# Patient Record
Sex: MALE | Race: WHITE | NOT HISPANIC OR LATINO | URBAN - METROPOLITAN AREA
[De-identification: names, ages, dates, MRNs, and addresses within clinical notes are randomized per-mention and may not be internally consistent; named-entity substitution may affect disease eponyms.]

---

## 2017-03-05 ENCOUNTER — EMERGENCY (EMERGENCY)
Facility: HOSPITAL | Age: 57
LOS: 1 days | Discharge: HOME | End: 2017-03-05
Attending: EMERGENCY MEDICINE | Admitting: EMERGENCY MEDICINE
Payer: COMMERCIAL

## 2017-03-05 VITALS
SYSTOLIC BLOOD PRESSURE: 130 MMHG | TEMPERATURE: 98 F | RESPIRATION RATE: 16 BRPM | DIASTOLIC BLOOD PRESSURE: 85 MMHG | HEART RATE: 75 BPM | OXYGEN SATURATION: 98 %

## 2017-03-05 VITALS
SYSTOLIC BLOOD PRESSURE: 161 MMHG | OXYGEN SATURATION: 95 % | DIASTOLIC BLOOD PRESSURE: 112 MMHG | RESPIRATION RATE: 17 BRPM | TEMPERATURE: 97 F | HEART RATE: 97 BPM

## 2017-03-05 DIAGNOSIS — M54.5 LOW BACK PAIN: ICD-10-CM

## 2017-03-05 PROCEDURE — 99284 EMERGENCY DEPT VISIT MOD MDM: CPT | Mod: 25

## 2017-03-05 PROCEDURE — 96374 THER/PROPH/DIAG INJ IV PUSH: CPT

## 2017-03-05 PROCEDURE — 99284 EMERGENCY DEPT VISIT MOD MDM: CPT

## 2017-03-05 RX ORDER — OXYCODONE HYDROCHLORIDE 5 MG/1
5 TABLET ORAL ONCE
Qty: 0 | Refills: 0 | Status: DISCONTINUED | OUTPATIENT
Start: 2017-03-05 | End: 2017-03-05

## 2017-03-05 RX ORDER — DIAZEPAM 5 MG
5 TABLET ORAL ONCE
Qty: 0 | Refills: 0 | Status: DISCONTINUED | OUTPATIENT
Start: 2017-03-05 | End: 2017-03-05

## 2017-03-05 RX ORDER — IBUPROFEN 200 MG
1 TABLET ORAL
Qty: 21 | Refills: 0 | OUTPATIENT
Start: 2017-03-05 | End: 2017-03-12

## 2017-03-05 RX ORDER — KETOROLAC TROMETHAMINE 30 MG/ML
30 SYRINGE (ML) INJECTION ONCE
Qty: 0 | Refills: 0 | Status: DISCONTINUED | OUTPATIENT
Start: 2017-03-05 | End: 2017-03-05

## 2017-03-05 RX ORDER — DIAZEPAM 5 MG
1 TABLET ORAL
Qty: 15 | Refills: 0 | OUTPATIENT
Start: 2017-03-05 | End: 2017-03-10

## 2017-03-05 RX ORDER — OXYCODONE HYDROCHLORIDE 5 MG/1
1 TABLET ORAL
Qty: 20 | Refills: 0 | OUTPATIENT
Start: 2017-03-05 | End: 2017-03-10

## 2017-03-05 RX ADMIN — OXYCODONE HYDROCHLORIDE 5 MILLIGRAM(S): 5 TABLET ORAL at 16:10

## 2017-03-05 RX ADMIN — OXYCODONE HYDROCHLORIDE 5 MILLIGRAM(S): 5 TABLET ORAL at 17:56

## 2017-03-05 RX ADMIN — Medication 5 MILLIGRAM(S): at 16:10

## 2017-03-05 RX ADMIN — Medication 30 MILLIGRAM(S): at 16:10

## 2017-03-05 NOTE — ED PROVIDER NOTE - ATTENDING CONTRIBUTION TO CARE
I have personally performed a face to face diagnostic evaluation on this patient.  I have reviewed the ACP note and agree with the history, exam, and plan of care, except as noted.   My medical decison making and observations are found above.

## 2017-03-05 NOTE — ED ADULT NURSE NOTE - OBJECTIVE STATEMENT
56 yr old male with wife from home, through triage, presents with L sided lower back pain, started after pulling dresser drawer open, "I threw my back out", was also lifting 25-30 lb boxes from storage, pain unrelieved with advil at 1 pm, at present c/o L sided pain, radiating to R lower back, into upper R buttock, denies bowel/bladder incontinence, not radiating to legs, +tender to L lower paraspinal musculature, +antalgic gait, at baseline ambulatory, independent with ADLs, strong extremities to manual resistance, full AROM x 4 extremities noted

## 2017-03-05 NOTE — ED PROVIDER NOTE - OBJECTIVE STATEMENT
57yo male pt, no PMHx c/o low back pain/ stiffness after bending over this am. Denies fall or other injuries. Denies radiating pain to legs. Denies sensory changes or weakness. Denies CP/SOB/ABD pain or N/V/D. Denies urinary or bowel problems. Denies fever, chills or recent cold symptoms.

## 2017-03-05 NOTE — ED PROVIDER NOTE - PROGRESS NOTE DETAILS
Pt stated feeling better, ambulatory, Neuro- intact Pt stated feeling better, ambulatory, Neuro- intact  marla marcelino md

## 2017-03-05 NOTE — ED PROVIDER NOTE - MEDICAL DECISION MAKING DETAILS
juan;son - acute lower back pain after bending - no red flags - no neuro deficit - analgesia muscle relaxer asnd reeval - likely outpt spine f/u

## 2017-03-05 NOTE — ED PROVIDER NOTE - PHYSICAL EXAMINATION
NAD, Hypertensive, Afebrile, No spinal mid tender, + B/L para lumbar tender/ stiffness, No sciatica tender, Neuro- intact. NAD, Hypertensive, Afebrile, No spinal mid tender, + B/L para lumbar tender/ stiffness, No sciatica tender, Neuro- intact.  hanna antalgic gait stregth 5/5le bl, slt neg paraspinal ttp l>r  no saddle anestheisa

## 2023-04-17 ENCOUNTER — EMERGENCY (EMERGENCY)
Facility: HOSPITAL | Age: 63
LOS: 1 days | Discharge: HOME | End: 2023-04-17
Attending: EMERGENCY MEDICINE
Payer: COMMERCIAL

## 2023-04-17 VITALS
RESPIRATION RATE: 20 BRPM | DIASTOLIC BLOOD PRESSURE: 89 MMHG | HEIGHT: 71 IN | WEIGHT: 190.04 LBS | TEMPERATURE: 98 F | SYSTOLIC BLOOD PRESSURE: 145 MMHG | HEART RATE: 84 BPM | OXYGEN SATURATION: 97 %

## 2023-04-17 VITALS
SYSTOLIC BLOOD PRESSURE: 168 MMHG | DIASTOLIC BLOOD PRESSURE: 84 MMHG | OXYGEN SATURATION: 97 % | HEART RATE: 82 BPM | RESPIRATION RATE: 18 BRPM

## 2023-04-17 LAB
ALBUMIN SERPL ELPH-MCNC: 4.5 G/DL — SIGNIFICANT CHANGE UP (ref 3.3–5)
ALP SERPL-CCNC: 99 U/L — SIGNIFICANT CHANGE UP (ref 40–120)
ALT FLD-CCNC: 22 U/L — SIGNIFICANT CHANGE UP (ref 10–45)
ANION GAP SERPL CALC-SCNC: 12 MMOL/L — SIGNIFICANT CHANGE UP (ref 5–17)
AST SERPL-CCNC: 19 U/L — SIGNIFICANT CHANGE UP (ref 10–40)
BASOPHILS # BLD AUTO: 0.04 K/UL — SIGNIFICANT CHANGE UP (ref 0–0.2)
BASOPHILS NFR BLD AUTO: 0.4 % — SIGNIFICANT CHANGE UP (ref 0–2)
BILIRUB SERPL-MCNC: 0.5 MG/DL — SIGNIFICANT CHANGE UP (ref 0.2–1.2)
BUN SERPL-MCNC: 20 MG/DL — SIGNIFICANT CHANGE UP (ref 7–23)
CALCIUM SERPL-MCNC: 10.1 MG/DL — SIGNIFICANT CHANGE UP (ref 8.4–10.5)
CHLORIDE SERPL-SCNC: 104 MMOL/L — SIGNIFICANT CHANGE UP (ref 96–108)
CO2 SERPL-SCNC: 25 MMOL/L — SIGNIFICANT CHANGE UP (ref 22–31)
CREAT SERPL-MCNC: 0.91 MG/DL — SIGNIFICANT CHANGE UP (ref 0.5–1.3)
CRP SERPL-MCNC: 14 MG/L — HIGH (ref 0–4)
EGFR: 94 ML/MIN/1.73M2 — SIGNIFICANT CHANGE UP
EOSINOPHIL # BLD AUTO: 0.06 K/UL — SIGNIFICANT CHANGE UP (ref 0–0.5)
EOSINOPHIL NFR BLD AUTO: 0.5 % — SIGNIFICANT CHANGE UP (ref 0–6)
FLUAV AG NPH QL: SIGNIFICANT CHANGE UP
FLUBV AG NPH QL: SIGNIFICANT CHANGE UP
GLUCOSE SERPL-MCNC: 102 MG/DL — HIGH (ref 70–99)
HCT VFR BLD CALC: 45.6 % — SIGNIFICANT CHANGE UP (ref 39–50)
HGB BLD-MCNC: 15.3 G/DL — SIGNIFICANT CHANGE UP (ref 13–17)
IMM GRANULOCYTES NFR BLD AUTO: 0.5 % — SIGNIFICANT CHANGE UP (ref 0–0.9)
LYMPHOCYTES # BLD AUTO: 3.66 K/UL — HIGH (ref 1–3.3)
LYMPHOCYTES # BLD AUTO: 32.2 % — SIGNIFICANT CHANGE UP (ref 13–44)
MCHC RBC-ENTMCNC: 29.3 PG — SIGNIFICANT CHANGE UP (ref 27–34)
MCHC RBC-ENTMCNC: 33.6 GM/DL — SIGNIFICANT CHANGE UP (ref 32–36)
MCV RBC AUTO: 87.4 FL — SIGNIFICANT CHANGE UP (ref 80–100)
MONOCYTES # BLD AUTO: 0.55 K/UL — SIGNIFICANT CHANGE UP (ref 0–0.9)
MONOCYTES NFR BLD AUTO: 4.8 % — SIGNIFICANT CHANGE UP (ref 2–14)
NEUTROPHILS # BLD AUTO: 6.99 K/UL — SIGNIFICANT CHANGE UP (ref 1.8–7.4)
NEUTROPHILS NFR BLD AUTO: 61.6 % — SIGNIFICANT CHANGE UP (ref 43–77)
NRBC # BLD: 0 /100 WBCS — SIGNIFICANT CHANGE UP (ref 0–0)
PLATELET # BLD AUTO: 381 K/UL — SIGNIFICANT CHANGE UP (ref 150–400)
POTASSIUM SERPL-MCNC: 4.6 MMOL/L — SIGNIFICANT CHANGE UP (ref 3.5–5.3)
POTASSIUM SERPL-SCNC: 4.6 MMOL/L — SIGNIFICANT CHANGE UP (ref 3.5–5.3)
PROT SERPL-MCNC: 7.9 G/DL — SIGNIFICANT CHANGE UP (ref 6–8.3)
RBC # BLD: 5.22 M/UL — SIGNIFICANT CHANGE UP (ref 4.2–5.8)
RBC # FLD: 13.1 % — SIGNIFICANT CHANGE UP (ref 10.3–14.5)
RSV RNA NPH QL NAA+NON-PROBE: SIGNIFICANT CHANGE UP
SARS-COV-2 RNA SPEC QL NAA+PROBE: SIGNIFICANT CHANGE UP
SODIUM SERPL-SCNC: 141 MMOL/L — SIGNIFICANT CHANGE UP (ref 135–145)
WBC # BLD: 11.36 K/UL — HIGH (ref 3.8–10.5)
WBC # FLD AUTO: 11.36 K/UL — HIGH (ref 3.8–10.5)

## 2023-04-17 PROCEDURE — 86140 C-REACTIVE PROTEIN: CPT

## 2023-04-17 PROCEDURE — 80053 COMPREHEN METABOLIC PANEL: CPT

## 2023-04-17 PROCEDURE — 12001 RPR S/N/AX/GEN/TRNK 2.5CM/<: CPT

## 2023-04-17 PROCEDURE — 99284 EMERGENCY DEPT VISIT MOD MDM: CPT | Mod: 25

## 2023-04-17 PROCEDURE — 73090 X-RAY EXAM OF FOREARM: CPT | Mod: 26,LT

## 2023-04-17 PROCEDURE — 85025 COMPLETE CBC W/AUTO DIFF WBC: CPT

## 2023-04-17 PROCEDURE — 96365 THER/PROPH/DIAG IV INF INIT: CPT | Mod: XU

## 2023-04-17 PROCEDURE — 73110 X-RAY EXAM OF WRIST: CPT | Mod: 26,RT

## 2023-04-17 PROCEDURE — 87637 SARSCOV2&INF A&B&RSV AMP PRB: CPT

## 2023-04-17 PROCEDURE — 90675 RABIES VACCINE IM: CPT

## 2023-04-17 PROCEDURE — 90471 IMMUNIZATION ADMIN: CPT

## 2023-04-17 PROCEDURE — 73110 X-RAY EXAM OF WRIST: CPT

## 2023-04-17 PROCEDURE — 73090 X-RAY EXAM OF FOREARM: CPT

## 2023-04-17 PROCEDURE — 99285 EMERGENCY DEPT VISIT HI MDM: CPT | Mod: 25

## 2023-04-17 PROCEDURE — 90377 RABIES IG HT&SOL HUMAN IM/SC: CPT

## 2023-04-17 PROCEDURE — 96372 THER/PROPH/DIAG INJ SC/IM: CPT | Mod: XU

## 2023-04-17 PROCEDURE — 85652 RBC SED RATE AUTOMATED: CPT

## 2023-04-17 RX ORDER — RABIES VACC, HUMAN DIPLOID/PF 2.5 UNIT
1 VIAL (EA) INTRAMUSCULAR ONCE
Refills: 0 | Status: COMPLETED | OUTPATIENT
Start: 2023-04-17 | End: 2023-04-17

## 2023-04-17 RX ORDER — AMPICILLIN SODIUM AND SULBACTAM SODIUM 250; 125 MG/ML; MG/ML
3 INJECTION, POWDER, FOR SUSPENSION INTRAMUSCULAR; INTRAVENOUS ONCE
Refills: 0 | Status: COMPLETED | OUTPATIENT
Start: 2023-04-17 | End: 2023-04-17

## 2023-04-17 RX ORDER — HUMAN RABIES VIRUS IMMUNE GLOBULIN 150 [IU]/ML
1700 INJECTION, SOLUTION INTRAMUSCULAR ONCE
Refills: 0 | Status: COMPLETED | OUTPATIENT
Start: 2023-04-17 | End: 2023-04-17

## 2023-04-17 RX ADMIN — HUMAN RABIES VIRUS IMMUNE GLOBULIN 1700 UNIT(S): 150 INJECTION, SOLUTION INTRAMUSCULAR at 13:25

## 2023-04-17 RX ADMIN — Medication 1 MILLILITER(S): at 14:02

## 2023-04-17 RX ADMIN — AMPICILLIN SODIUM AND SULBACTAM SODIUM 200 GRAM(S): 250; 125 INJECTION, POWDER, FOR SUSPENSION INTRAMUSCULAR; INTRAVENOUS at 14:02

## 2023-04-17 RX ADMIN — AMPICILLIN SODIUM AND SULBACTAM SODIUM 3 GRAM(S): 250; 125 INJECTION, POWDER, FOR SUSPENSION INTRAMUSCULAR; INTRAVENOUS at 14:40

## 2023-04-17 NOTE — ED PROVIDER NOTE - CLINICAL SUMMARY MEDICAL DECISION MAKING FREE TEXT BOX
This is a 62-year-old male who had a bite by a dog unknown.  He never got the rabies shot.  Now there is some discharge from the wound.  And we discussed this case with Ortho hand and they came to see the patient and they recommend opening the stitches and doing a dose of IV antibiotic and reassess the patient.  Clinically the patient is well-appearing and afebrile and probably will not require admission.  We will reassess the patient after opening the wound.  -Fredy Russ

## 2023-04-17 NOTE — ED ADULT NURSE NOTE - OBJECTIVE STATEMENT
1255 62 yr old WM c/o redness and swelling at right wrist. s/p dog bite to right wrist by a neighbor's dog 3 days ago. Was treated at an urgicenter  with stitches to site, tetanus shot given and started on po PCN. Denies fever or chills. A&Ox4. Ambulatory. Dr ferreira pt. Denies fever or chills. Swelling and erythema noted at right volar wrist. Pt not sure if dog was vaccinated

## 2023-04-17 NOTE — ED PROVIDER NOTE - OBJECTIVE STATEMENT
62 male history HTN presenting with dog bite.  Patient had dog bite to right forearm from from unknown neighbors dog 3 days ago, went to urgent care where he was given tetanus shot, started on p.o. antibiotics and wound was closed with sutures.  He received a call from urgent care today instructing him to get rabies shots, as the dog cannot be tracked for vaccine record or monitor for symptoms.  Patient has been taking antibiotics as prescribed.  Did not denies worsening redness, numbness, tingling, weakness, fever, chills.  Right-hand-dominant

## 2023-04-17 NOTE — ED PROVIDER NOTE - NSFOLLOWUPINSTRUCTIONS_ED_ALL_ED_FT
Please follow up with your primary care doctor within 1 week.  Follow up with plastic surgery team Dr. Dunne in 1 week    Return to UD/plastic surgery for suture removal in 7-10 days.    *Bring all printed lab/test results to your appointment(s).*    Continue to take antibiotics as prescribed.  Take acetaminophen 500-1000mg every 6 hrs as needed for pain. DO NOT EXCEED 4000mg DAILY.  Take ibuprofen 400-600mg every 6 hrs as needed for pain. Take with food    Return to the ED for worsening pain, numbness, tingling, weakness, or any other concerns. Please follow up with your primary care doctor within 1 week.  Follow up with plastic surgery team Dr. Dunne in 1 week    Return to the ED for additional rabies vaccinations on the following dates:  -4/20/2023  -4/24/2023  -5/1/2023    Return to UD/plastic surgery for suture removal in 7-10 days. You can get this done the same visit for your rabies shot on 4/24.     *Bring all printed lab/test results to your appointment(s).*    Continue to take antibiotics as prescribed.  Take acetaminophen 500-1000mg every 6 hrs as needed for pain. DO NOT EXCEED 4000mg DAILY.  Take ibuprofen 400-600mg every 6 hrs as needed for pain. Take with food    Return to the ED for worsening pain, numbness, tingling, weakness, or any other concerns.

## 2023-04-17 NOTE — ED PROVIDER NOTE - SKIN COLOR
1cm linear laceration to volar surface of R forearm with 3 nylon sutures in place, +yellow purulence expressible from wound., surrounding warmth/erythema/ttp extending several cm without fluctuance or crepitus.

## 2023-04-17 NOTE — CHART NOTE - NSCHARTNOTEFT_GEN_A_CORE
Orthopaedic Hand    Patient discharged before definitive plan given. Recommended calling patient back in for further observation and IV Abx. ED attempting to call patient back in.

## 2023-04-17 NOTE — ED PROCEDURE NOTE - PROCEDURE ADDITIONAL DETAILS
infected dog bite with 1cm laceration closed at urgent care 3 days ago. injected 1-3mL 1% lido w/epi with appropriate anesthesia obtained, sutures removed and wound was irrigated thoroughly with expression of purulence. placed dressing with nonadherent gauze and cling wrap - Desean Brizuela PA-C
loosely approximated 1cm wound from dog bite 3 days ago after initial sutures were removed, small amt of purulent expressed. seen by ortho hand team who recommended primary closure again - Desean Brizuela PA-C

## 2023-04-17 NOTE — ED PROVIDER NOTE - PROGRESS NOTE DETAILS
spoke with ortho hand who saw pt - recommends removing sutures, irrigate, minimum of 1 dose of IV unasyn. - MASOUD BautistaC hand recommended re-closure, wound was irrigated thoroughly and closed with loose approximation, sterile dressing. CRP mildly elevated otherwise nonactionable, no visible subcutaneous gas on XR. Will dc with follow up. Discussed plan and return precautions with patient who understands and agrees. All questions answered. - Desean Brizuela PA-C

## 2023-04-17 NOTE — ED PROCEDURE NOTE - ATTENDING APP SHARED VISIT CONTRIBUTION OF CARE
Dr. GRICEL Russ:  I have personally evaluated the patient and have documented above as appropriate. I perfomed a substantive  (greater than 50%) portion of the visit including all aspects of the medical decision making.
Dr. GRICEL Russ:  I have personally evaluated the patient and have documented above as appropriate. I perfomed a substantive  (greater than 50%) portion of the visit including all aspects of the medical decision making.

## 2023-04-17 NOTE — ED PROVIDER NOTE - PATIENT PORTAL LINK FT
You can access the FollowMyHealth Patient Portal offered by [facility] by registering at the following website: [email or web address]. By joining [employer]’s FollowMyHealth portal, you will also be able to view your health information using other applications (apps) compatible with our system.

## 2023-04-17 NOTE — ED ADULT TRIAGE NOTE - CHIEF COMPLAINT QUOTE
right lower arm dogbite last friday.  Went to urgent care on the same day & started on antibiotic. Called back today & instructed to come to ER for rabies shots.

## 2023-04-17 NOTE — ED PROVIDER NOTE - CARE PROVIDER_API CALL
Raymundo Dunne)  Orthopedics  [address] [place] [address]  Phone: [phone]  Fax: [phone]  Follow Up Time:

## 2023-04-17 NOTE — ED POST DISCHARGE NOTE - RESULT SUMMARY
spoke with orthopedic team covering for hand, requests pt returns to the ED for continued IV abx for minimum of 16hrs. I spoke with pt over phone who is agreeable to return today - Desean Brizuela PA-C

## 2023-04-18 LAB — ERYTHROCYTE [SEDIMENTATION RATE] IN BLOOD: 16 MM/HR — SIGNIFICANT CHANGE UP (ref 0–20)

## 2023-04-20 ENCOUNTER — EMERGENCY (EMERGENCY)
Facility: HOSPITAL | Age: 63
LOS: 1 days | Discharge: HOME | End: 2023-04-20
Attending: STUDENT IN AN ORGANIZED HEALTH CARE EDUCATION/TRAINING PROGRAM
Payer: COMMERCIAL

## 2023-04-20 VITALS
HEIGHT: 71 IN | SYSTOLIC BLOOD PRESSURE: 141 MMHG | DIASTOLIC BLOOD PRESSURE: 80 MMHG | TEMPERATURE: 98 F | WEIGHT: 190.04 LBS | HEART RATE: 77 BPM | RESPIRATION RATE: 20 BRPM | OXYGEN SATURATION: 96 %

## 2023-04-20 PROCEDURE — 90471 IMMUNIZATION ADMIN: CPT

## 2023-04-20 PROCEDURE — 90675 RABIES VACCINE IM: CPT

## 2023-04-20 PROCEDURE — 99281 EMR DPT VST MAYX REQ PHY/QHP: CPT | Mod: 25

## 2023-04-20 PROCEDURE — 99284 EMERGENCY DEPT VISIT MOD MDM: CPT

## 2023-04-20 RX ORDER — RABIES VACC, HUMAN DIPLOID/PF 2.5 UNIT
1 VIAL (EA) INTRAMUSCULAR ONCE
Refills: 0 | Status: COMPLETED | OUTPATIENT
Start: 2023-04-20 | End: 2023-04-20

## 2023-04-20 RX ADMIN — Medication 1 MILLILITER(S): at 18:37

## 2023-04-20 NOTE — ED PROVIDER NOTE - NSFOLLOWUPINSTRUCTIONS_ED_ALL_ED_FT
1) Follow-up with your primary care provider in 1-2 days.     You will need to return to the emergency department on the following days to complete the series of the rabies vaccine:  -Day 7: 4/24/23  -Day 14: 5/1/23    2)  Take your antibiotics as prescribed, finish the full course do not skip doses.      Continue to take all other daily medications as prescribed.    3) Rest and drink plenty of fluids. Pain can be managed with Acetaminophen (aka Tylenol) and Ibuprofen (aka Motrin or Advil) over the counter as directed.    4) Return to the ER for any new or worsening symptoms.      Please read all attached patient information.

## 2023-04-20 NOTE — ED PROVIDER NOTE - CLINICAL SUMMARY MEDICAL DECISION MAKING FREE TEXT BOX
62 M w/ hx of HTN presents to the ER w/ R wrist pain and swelling, pt reports that he is compliant w/ augmentin, no fevers, no chills, no anusea no vomiting, no redness of the arm, pt w/ a laceration on the R wrist sutured, no purulent discharge when palpated, pt w/ intact flexion and extension has intact strength in the fingers, intact sensation in the hands. Plan for 2nd rabies vaccine and reassessment. will recommend for sutures to be removed in 10-14 days from injury. pt to continue antibiotics.

## 2023-04-20 NOTE — ED PROVIDER NOTE - PATIENT PORTAL LINK FT
You can access the FollowMyHealth Patient Portal offered by [facility] by registering at the following website: [email or web address]. By joining [employer]’s FollowMyHealth portal, you will also be able to view your health information using other applications (apps) compatible with our system.

## 2023-04-20 NOTE — ED PROVIDER NOTE - OBJECTIVE STATEMENT
62-year-old male past medical history of HTN presents for rabies vaccine dose #2.  Patient had dog bite approximately 6 days ago from unknown neighbors dog, bit right forearm, patient is right-hand dominant.  Patient wound was initially evaluated in urgent care who started patient on oral antibiotics and sutured the wound, patient was seen in ED on 4/17/2023, who consulted with hand, wound was opened, irrigated, and reclosed.  Patient reports taking Augmentin p.o. twice daily as prescribed.  Has had a dressing over the wound since last ED visit.  Patient denies increased pain in the area, pain with range of motion, numbness, paresthesias, weakness, fevers, chills or any other complaints.

## 2023-04-20 NOTE — ED PROVIDER NOTE - PHYSICAL EXAMINATION
GEN: Pt in NAD, A&O x3.  PSYCH: Affect appropriate.  EYES: Sclera white w/o injection.   ENT: Head NCAT. Neck supple FROM.  RESP: No evidence of respiratory distress.  VASC: 2+ radial pulse b/l. No upper extremity edema.  MSK: No joint erythema or obvious deformity. No bony tenderness b/l UE. FROM b/l UE, no pain with passive or active ROM.  NEURO: Normal and equal sensation and 5/5 strength b/l UE.  SKIN: approximately 1cm linear laceration with 3 sutures in place on R volar forearm, no evidence of dehiscence, no bleeding, no DC, no surrounding erythema, no crepitus.

## 2023-04-21 PROBLEM — I10 ESSENTIAL (PRIMARY) HYPERTENSION: Chronic | Status: ACTIVE | Noted: 2023-04-17

## 2023-04-24 ENCOUNTER — EMERGENCY (EMERGENCY)
Facility: HOSPITAL | Age: 63
LOS: 1 days | Discharge: HOME | End: 2023-04-24
Attending: STUDENT IN AN ORGANIZED HEALTH CARE EDUCATION/TRAINING PROGRAM
Payer: COMMERCIAL

## 2023-04-24 VITALS
SYSTOLIC BLOOD PRESSURE: 143 MMHG | OXYGEN SATURATION: 96 % | WEIGHT: 188.94 LBS | HEIGHT: 71 IN | DIASTOLIC BLOOD PRESSURE: 81 MMHG | RESPIRATION RATE: 19 BRPM | TEMPERATURE: 97 F | HEART RATE: 75 BPM

## 2023-04-24 VITALS
OXYGEN SATURATION: 97 % | RESPIRATION RATE: 18 BRPM | HEART RATE: 71 BPM | TEMPERATURE: 98 F | SYSTOLIC BLOOD PRESSURE: 152 MMHG | DIASTOLIC BLOOD PRESSURE: 90 MMHG

## 2023-04-24 PROCEDURE — 90675 RABIES VACCINE IM: CPT

## 2023-04-24 PROCEDURE — 99281 EMR DPT VST MAYX REQ PHY/QHP: CPT

## 2023-04-24 PROCEDURE — 99284 EMERGENCY DEPT VISIT MOD MDM: CPT

## 2023-04-24 RX ORDER — RABIES VACC, HUMAN DIPLOID/PF 2.5 UNIT
1 VIAL (EA) INTRAMUSCULAR ONCE
Refills: 0 | Status: DISCONTINUED | OUTPATIENT
Start: 2023-04-24 | End: 2023-04-24

## 2023-04-24 RX ORDER — RABIES VACC, HUMAN DIPLOID/PF 2.5 UNIT
1 VIAL (EA) INTRAMUSCULAR ONCE
Refills: 0 | Status: COMPLETED | OUTPATIENT
Start: 2023-04-24 | End: 2023-04-24

## 2023-04-24 RX ADMIN — Medication 1 MILLILITER(S): at 12:49

## 2023-04-24 NOTE — ED ADULT NURSE NOTE - OBJECTIVE STATEMENT
62y Male AOx4 presents to the ED via walk-in for 3rd dose of rabies vaccine. Dog bite noted to right wrist with x3 sutures, states he is due for suture removal. Reports Site appears clean, dry, intact. Denies any pain, redness, drainage or pus. Sensation and ROM of right hand intact. Denies N/V, fever/chills, SOB, chest pain. Spontaneous/unlabored respirations, speaking in full sentences. Side rails up, bed in lowest position, safety maintained.

## 2023-04-24 NOTE — ED PROVIDER NOTE - NS ED ROS FT
Gen: Denies fever, weight loss  HEENT: Denies vision changes, ear pain, epistaxis, sore throat  CV: Denies chest pain, palpitations  Skin: Denies rash, erythema, color changes  Resp: Denies SOB, cough  Endo: Denies sensitivity to heat, cold, increased urination  GI: Denies abdominal pain, constipation, nausea, vomiting, diarrhea  Msk: Denies back pain, LE swelling, extremity pain  : Denies dysuria, increased frequency  Neuro: Denies LOC, weakness, numbness, tingling  Psych: Denies hx of psych, hallucinations  ROS statement: all other ROS negative except as per HPI

## 2023-04-24 NOTE — ED PROVIDER NOTE - NSFOLLOWUPINSTRUCTIONS_ED_ALL_ED_FT
1) Follow-up with your primary care provider in 1-2 days.     You will need to return to the emergency department on the following days to complete the series of the rabies vaccine:  -Day 14: 5/1/23    2)  Take your antibiotics as prescribed, finish the full course do not skip doses.      Continue to take all other daily medications as prescribed.    3) Rest and drink plenty of fluids. Pain can be managed with Acetaminophen (aka Tylenol) and Ibuprofen (aka Motrin or Advil) over the counter as directed.    4) Return to the ER for any new or worsening symptoms.

## 2023-04-24 NOTE — ED PROVIDER NOTE - OBJECTIVE STATEMENT
62-year-old male, past medical history of hypertension, presents to ED for rabies vaccine #3.  Patient had dog bite to right forearm approximately 10 days ago.  Patient has completed course of Augmentin.  Patient already evaluated by hand surgeon.  Patient states supposed to get sutures out today.  Patient denies fever/chills, purulent drainage, weakness/numbness in the right upper extremity or any other symptoms at this time.

## 2023-04-24 NOTE — ED PROVIDER NOTE - PHYSICAL EXAMINATION
PHYSICAL EXAM:  GENERAL: non-toxic appearing; in no respiratory distress  HEENT: Atraumatic, Normocephalic, PERRL, EOMs intact b/l w/out deficits, no conjunctival pallor, MMM  NECK: No JVD; FROM  CHEST/LUNG: CTAB no wheezes/rhonchi/rales  HEART: RRR no murmur/gallops/rubs  ABDOMEN: +BS, soft, NT, ND  EXTREMITIES: No LE edema, +2 radial pulses b/l, +2 DP/PT pulses b/l  MUSCULOSKELETAL: FROM of all 4 extremities  NERVOUS SYSTEM:  A&Ox3, No motor deficits or sensory deficits; CNII-XII intact; no focal neurologic deficits  SKIN:  No new rashes; 1cm lac to Rt volar forearm w/ 3 sutures in place w/o evidence of dehiscence, erythema, purulence, or fluctuance; appears to be healing well.

## 2023-04-24 NOTE — ED PROVIDER NOTE - ATTENDING CONTRIBUTION TO CARE
Attending (Williams Marcelino M.D.):  I have personally seen and examined this patient. I have performed a substantive portion of the visit including all aspects of the medical decision making. Resident and/or ACP note reviewed. I agree on the plan of care except where noted.

## 2023-04-24 NOTE — ED PROVIDER NOTE - CLINICAL SUMMARY MEDICAL DECISION MAKING FREE TEXT BOX
62-year-old male, past medical history of hypertension, presents to ED for rabies vaccine #3.  Patient had dog bite to right forearm approximately 10 days ago.     VSS. Physical exam significant for healing Rt forearm lac w/ 3 sutures in place. No signs of wound dehiscence or s/s of infection.    Plan to administer 3rd rabies vaccine and suture removal. Will d/c and instruct to return on day #14 (5/1/23) for 4th rabies vaccine.

## 2023-04-24 NOTE — ED PROVIDER NOTE - NSICDXNOPASTSURGICALHX_GEN_ALL_ED
Continue cephalexin 500 mg four times a day    Add clindamycin 300 mg four times a day    Increase furosemide to 20 mg 2 tablets daily x1 week.   <-- Click to add NO significant Past Surgical History

## 2023-04-24 NOTE — ED PROVIDER NOTE - PATIENT PORTAL LINK FT
You can access the FollowMyHealth Patient Portal offered by [facility] by registering at the following website: [email or web address]. By joining [employer]’s FollowMyHealth portal, you will also be able to view your health information using other applications (apps) compatible with our system.

## 2023-05-02 ENCOUNTER — EMERGENCY (EMERGENCY)
Facility: HOSPITAL | Age: 63
LOS: 1 days | Discharge: HOME | End: 2023-05-02
Payer: COMMERCIAL

## 2023-05-02 VITALS
OXYGEN SATURATION: 95 % | TEMPERATURE: 98 F | HEIGHT: 71 IN | DIASTOLIC BLOOD PRESSURE: 79 MMHG | HEART RATE: 73 BPM | RESPIRATION RATE: 20 BRPM | WEIGHT: 190.04 LBS | SYSTOLIC BLOOD PRESSURE: 160 MMHG

## 2023-05-02 PROCEDURE — 90471 IMMUNIZATION ADMIN: CPT

## 2023-05-02 PROCEDURE — 99281 EMR DPT VST MAYX REQ PHY/QHP: CPT | Mod: 25

## 2023-05-02 PROCEDURE — 90675 RABIES VACCINE IM: CPT

## 2023-05-02 PROCEDURE — 99284 EMERGENCY DEPT VISIT MOD MDM: CPT

## 2023-05-02 RX ORDER — RABIES VACC, HUMAN DIPLOID/PF 2.5 UNIT
1 VIAL (EA) INTRAMUSCULAR ONCE
Refills: 0 | Status: COMPLETED | OUTPATIENT
Start: 2023-05-02 | End: 2023-05-02

## 2023-05-02 RX ADMIN — Medication 1 MILLILITER(S): at 13:40

## 2023-05-02 NOTE — ED PROVIDER NOTE - OBJECTIVE STATEMENT
62 male history HTN presents for 4th Rabies vaccine. Patient had dog bite to right forearm from unknown neighbors dog on 4/14, pt went to urgent care  on 4/17 where he was given tetanus shot, started on Augmentin and wound was closed with sutures and he was advised to come to ED for rabies vaccine. Pt received rabies immunoglobin on 4/17 with first rabies vaccine same day. He was seen subsequently on 4/20 and 4/24 for 2nd and third dosed of rabies vaccine with suture removal 4/24. He has been without complaints since initial visit. Feels his wounds are healing well. Denies increased pain, fevers, spreading redness, drainage, change in behavior

## 2023-05-02 NOTE — ED PROVIDER NOTE - CLINICAL SUMMARY MEDICAL DECISION MAKING FREE TEXT BOX
Medical decision making: Fourth rabies vaccination.  Administered in the ED without complication.  DC home return precautions.

## 2023-05-02 NOTE — ED PROVIDER NOTE - ATTENDING APP SHARED VISIT CONTRIBUTION OF CARE
MD Sullivan:  patient seen and evaluated with the PA.  I was present for key portions of the History and Physical, and I agree with the Impression and Plan.    62-year-old male here for fourth rabies immunization.  Patient was bitten by a pit bull that did not belong to him, immunization status was unknown.  Patient has tolerated preceding 3 vaccinations fine.  Here for fourth shot.    Location of injury was volar aspect right wrist.  VS: wnl.   Gen:  Well appearing in NAD.  Head:  NC/AT.  Resp: No distress.  Ext: no deformities.  Skin: warm and dry as visualized.  Neuro: alert and oriented to person, place, time.    Medical decision making: Fourth rabies vaccination.  Administered in the ED without complication.  DC home return precautions.

## 2023-05-02 NOTE — ED PROVIDER NOTE - NSFOLLOWUPINSTRUCTIONS_ED_ALL_ED_FT
1. Please follow up with your Primary Care Doctor after discharge to discuss ED visit    2. Please rest, stay hydrated and continue all at home medications as previously prescribed    3. Continue to monitor your wounds for continued healing    4. Return to ED for any new or worsened symptoms including increased pain, spreading redness, drainage, fevers and any other concerns

## 2023-05-02 NOTE — ED PROVIDER NOTE - PHYSICAL EXAMINATION
CONSTITUTIONAL: Patient is awake, alert and oriented x 3. Patient is well appearing and in no acute distress  HEAD: NCAT  NECK: supple, FROM  LUNGS: CTA b/l, no wheezing or rales   HEART: RRR.+S1S2 no murmurs  ABDOMEN: Soft, non-distended, nttp, no rebound or guarding  EXTREMITY: no edema or calf tenderness b/l, FROM upper and lower ext b/l  SKIN: 1cm healed/scabbed laceration to Rt  distal volar forearm, no drainage or surrounding erythema  NEURO: No focal deficits

## 2023-05-02 NOTE — ED PROVIDER NOTE - PATIENT PORTAL LINK FT
You can access the FollowMyHealth Patient Portal offered by [facility] by registering at the following website: [email or web address]. By joining [employer]’s FollowMyHealth portal, you will also be able to view your health information using other applications (apps) compatible with our system.

## 2023-05-02 NOTE — ED ADULT NURSE NOTE - OBJECTIVE STATEMENT
Patient is a 62y male presenting to the ED for rabies follow up. Patient A&Ox4. Patient presents to ED today for the 4th dose of the rabies vaccine. Patient was bit by a dog on 4/14 on the right wrist and was unsure of vaccination status. Patient went to urgent care of 4/17, had the wound sutured, received a tetanus shot and was started on PO antibiotics (Augmentin). Patient was sent to ED for a rabies vaccine; received the rabies immunoglobulin and the 1st rabies vaccine on 4/17. Received the 2nd rabies vaccine dose on 4/20 and the 3rd rabies vaccine dose and suture removal on 4/24. The wound on the right wrist is without any redness or discharge. Patient denies any pain at the wound site. Denies chest pain, SOB, headache, N/V/D, abdominal pain, fever/chills, burning upon urination or difficulty urinating.

## 2025-04-03 NOTE — ED ADULT TRIAGE NOTE - RESPIRATORY RATE (BREATHS/MIN)
[facility] Call Center    Phone Message    May a detailed message be left on voicemail: yes     Reason for Call: Ritika from [facility] states she still can't get a hold of the pt, asking if there is any new info on how to contact the pt, please call Ritika at [phone] to discuss further.    Action Taken: Message routed to:  Other: [facility]    Travel Screening: Not Applicable     Date of Service:                                                                      20